# Patient Record
Sex: FEMALE | Race: WHITE | URBAN - METROPOLITAN AREA
[De-identification: names, ages, dates, MRNs, and addresses within clinical notes are randomized per-mention and may not be internally consistent; named-entity substitution may affect disease eponyms.]

---

## 2017-02-24 ENCOUNTER — TRANSFERRED RECORDS (OUTPATIENT)
Dept: HEALTH INFORMATION MANAGEMENT | Facility: CLINIC | Age: 57
End: 2017-02-24

## 2017-03-02 ENCOUNTER — DOCUMENTATION ONLY (OUTPATIENT)
Dept: LAB | Facility: CLINIC | Age: 57
End: 2017-03-02

## 2017-03-02 NOTE — PROGRESS NOTES
This lab would need to come from the provider that is requesting this lab, the hematologist.    Called patient on both home and cell, left message with phone number to call back.      Need to know who the hematologist is that would like this lab.      3/3/17:    See lab appointment was canceled after the message was left at patient's home yesterday.    Lyndsay Shah RN, [facility]

## 2017-03-02 NOTE — PROGRESS NOTES
Pt is coming in on 3-3 for blood work. Pt is requesting a fasting glucose test to be done  Thanks Mirela KIM

## 2017-09-22 ENCOUNTER — TRANSFERRED RECORDS (OUTPATIENT)
Dept: HEALTH INFORMATION MANAGEMENT | Facility: CLINIC | Age: 57
End: 2017-09-22

## 2017-10-29 ENCOUNTER — HEALTH MAINTENANCE LETTER (OUTPATIENT)
Age: 57
End: 2017-10-29

## 2017-12-12 ENCOUNTER — TELEPHONE (OUTPATIENT)
Dept: PEDIATRICS | Facility: CLINIC | Age: 57
End: 2017-12-12

## 2017-12-12 NOTE — TELEPHONE ENCOUNTER
"I received a telephone call from Chasidy \"Nikki\" regarding her grandchildren's recent genetics appointment for hemochromatosis.  At that appointment a plan was made to obtain records from Nikki and begin genetic testing on her, as she appears to be the most severely affected family member.  Nikki expressed understanding and will fax a signed release of information.  Once those records are obtained we will plan a genetic counseling appointment for genetic testing.  This will likely include sequencing of a panel of genes for hemochromatosis, as the pattern of affected individuals in the family is somewhat atypical of the common type of hemochromatosis.  Nikki was in agreement with this plan and had no additional questions or concerns at this time.      Kelli Miramontes MS [facility]  Genetic Counselor  Division of Genetics and Metabolism      "

## 2018-01-05 ENCOUNTER — TELEPHONE (OUTPATIENT)
Dept: PEDIATRICS | Facility: CLINIC | Age: 58
End: 2018-01-05

## 2018-01-05 NOTE — TELEPHONE ENCOUNTER
I contacted Nikki to discuss our plan to pursue additional genetic testing for hemochromatosis.  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  Dr. Beltran recommends sequencing of the HFE gene as well as other genes associated with hemochromatosis.  This can be accomplished by a genetic counseling appointment.  Nikki was in agreement with this plan and an appointment was scheduled at her convenience.  I remain available for additional questions or concerns in the meantime.      Kelli Miramontes MS [facility]  Genetic Counselor  Division of Genetics and Metabolism

## 2018-01-12 ENCOUNTER — OFFICE VISIT (OUTPATIENT)
Dept: PEDIATRICS | Facility: CLINIC | Age: 58
End: 2018-01-12
Attending: GENETIC COUNSELOR, MS
Payer: COMMERCIAL

## 2018-01-12 DIAGNOSIS — E83.110 HEREDITARY HEMOCHROMATOSIS (H): Primary | ICD-10-CM

## 2018-01-12 DIAGNOSIS — Z71.83 ENCOUNTER FOR NONPROCREATIVE GENETIC COUNSELING: ICD-10-CM

## 2018-01-12 DIAGNOSIS — R79.89 ELEVATED FERRITIN: ICD-10-CM

## 2018-01-12 DIAGNOSIS — Z82.79 FAMILY HISTORY OF CONGENITAL OR GENETIC CONDITION: ICD-10-CM

## 2018-01-12 PROCEDURE — 96040 ZZH GENETIC COUNSELING, EACH 30 MINUTES: CPT | Mod: ZF | Performed by: GENETIC COUNSELOR, MS

## 2018-01-12 PROCEDURE — 40000795 ZZHCL STATISTIC DNA PROCESS AND HOLD: Performed by: MEDICAL GENETICS

## 2018-01-12 PROCEDURE — 36415 COLL VENOUS BLD VENIPUNCTURE: CPT | Performed by: MEDICAL GENETICS

## 2018-01-12 NOTE — PROGRESS NOTES
"Presenting Information:  Chasidy \"Nikki\" Aundrea is a 57-year-old woman with a clinical diagnosis of hemochromatosis.  She has been found to have one copy of the common H63D mutation in the HFE gene by a 3-mutation panel.  I met with Nikki at the request of Dr. Parish Beltran in follow-up to Nikki's grandchildren's appointment for elevated ferritin.  During our visit the family history was updated, we reviewed the genetics and inheritance of hemochromatosis, and we discussed genetic testing which was pursued.      Family History:  A detailed pedigree had previously been obtained for Nikki's grandchildren.  This was updated today to include additional details about Nikki's family.  A copy was scanned into Nikki's electronic medical record.  It is significant for the following:    Nikki herself has elevated ferritin levels in the high triple digits.  She has had approximately four therepeutic phlebotomy draws in 2017 which initially looked to be reducing her ferritin but these have since rebounded to approximately 800 ng/mL.  She is waiting to proceed with more draws until her hemoglobin is higher which historically has been low.  Nikki had a hysterectomy at age 41 due to fibroids and a thyroidectomy due to precancerous cells.      Nikki has one daughter and one son, both of whom have been found to have one copy of the H63D mutation and elevated ferritin.      Nikki's daughter's children were previously seen by myself and Dr. Parish Beltran due to elevated ferritin around 700 nd/mL for both.      Nikki's son has four children, three of whom have experienced seizures.  It is not known if these children have had ferritin levels checked.    Nikki has two sisters; one of whom has ferritin in the 3000 range that has improved with phlebotomy.  She has not had genetic testing as far as Nikki is aware.      Nikki's mother is 76-years-old and has psoriatic arthritis.  Her ferritin has been measured to be normal.    Nikki's father is " 78-years-old.  He has type II diabetes.  He has been found to have ferritin in the 900s and carries one copy of the H63D mutation.      Nikki's father had nine brothers and sisters.  Multiple family members have had cancer.  We discussed that while cancer is unfortunately a common condition, having more cancer than expected in a family does raise the possibility of shared genetic or environmental causes increasing the risk.  Chasidy indicated she has discussed this history with her primary care provider who recommended early breast cancer screening which was certainly indicated.  Importantly, one of Nikki's uncles  of liver cirrhosis at age 49, although he did have a history of alcohol use.    Nikki is of , Monegasque, [place], and [place]ian ancestry on her maternal side and Polish and Roman Catholic ancestry on her paternal side.  Consanguinity was denied.      Discussion:  During our visit we reviewed that genes are long stretches of DNA that are responsible for how our bodies look and how our bodies work.  We all have two copies of each gene; one inherited from the mother and one inherited from the father.  When there is a change, called a mutation, in the DNA sequence of a gene it can cause the signs and symptoms of a genetic condition.       Hemochromatosis is due to mutations in one of several genes important for iron regulation in the body.  The most common type of hemochromatosis is due to a gene called HFE.  There are two common mutations in this gene that are responsible for the large majority of hemochromatosis cases: H63D and C282Y.  As noted in the family history, Nikki and numerous other family members have been found to have a single copy of the H63D mutation.       Hemochromatosis due to the HFE gene is inherited in an autosomal recessive pattern, meaning a patient would need to have a mutation in both copies of the HFE gene to have symptoms of hemochromatosis.  Therefore we are suspicious  that the affected family members may have a second unidentified mutation in the HFE gene.  This can occur because many genetic tests for hemochromatosis only look for the most common HFE mutations and do not identify more rare mutations.  Nikki's single copy of the H63D mutation does not offer sufficient explanation for her hemochromatosis.       In addition to the possibility of additional HFE mutations in the family, there are other genes that can cause hemochromatosis.  The [id number] gene, in particular, is associated with autosomal dominant hemochromatosis which fits the pattern of affected individuals in this family.  In an effort to determine the cause of hemochromatosis in the family which would clarify the risks and potential need for further management, more comprehensive genetic testing was recommended by Dr. Beltran.  This includes sequencing and deletion/duplication analysis of the HFE gene to identify more rare mutations, as well as sequencing and deletion/duplication analysis of the following additional genes: HAMP, HFE2, UNX25S7, TFR2, and FTH1.    Identifying the underlying cause for Chasidy's clinical diagnosis of hemochromatosis is important for several reasons.  This may help predict possible symptoms and additional complications besides the high ferritin, and may guide management including the need for therapeutic phlebotomy.  This will additionally help us better understand and predict the risks to other family members, and to facilitate testing for family members.    We reviewed the costs, limitations, and benefits of testing and Nikki provided written informed consent for this. We also discussed insurance coverage for testing and on Nikki's behalf a prior authorization for this test will be submitted to Nikki's insurance plan.  The testing will remain on hold until approval is obtained.  Once started, results of the testing take approximately 8-12 weeks and I will contact Nikki by telephone when  they return.       It was a pleasure meeting with Nikki today.  She was encouraged to contact me with additional questions or concerns.    Plan:  1.  A prior authorization will be submitted for genetic testing  2.  Once approved, genetic testing for hemochromatosis through analysis of the following genes: HFE, HAMP, HFE2, ELV11N2, TFR2, and FTH1      Kelli Miramontes [facility]  Genetic Counselor  Division of Genetics and Metabolism    Total time spent in consultation with the patient was approximately 35 minutes    Cc: No letter

## 2018-01-12 NOTE — LETTER
"  1/12/2018      RE: Chasidy Guillaume  [address]  [place] [place] [place] [address]       Presenting Information:  Chasidy \"Nikki\" Aundrea is a 57-year-old woman with a clinical diagnosis of hemochromatosis.  She has been found to have one copy of the common H63D mutation in the HFE gene by a 3-mutation panel.  I met with Nikki at the request of Dr. Parish Beltran in follow-up to Nikki's grandchildren's appointment for elevated ferritin.  During our visit the family history was updated, we reviewed the genetics and inheritance of hemochromatosis, and we discussed genetic testing which was pursued.      Family History:  A detailed pedigree had previously been obtained for Nikki's grandchildren.  This was updated today to include additional details about Nikki's family.  A copy was scanned into Nikki's electronic medical record.  It is significant for the following:    Nikki herself has elevated ferritin levels in the high triple digits.  She has had approximately four therepeutic phlebotomy draws in 2017 which initially looked to be reducing her ferritin but these have since rebounded to approximately 800 ng/mL.  She is waiting to proceed with more draws until her hemoglobin is higher which historically has been low.  Nikki had a hysterectomy at age 41 due to fibroids and a thyroidectomy due to precancerous cells.      Nikki has one daughter and one son, both of whom have been found to have one copy of the H63D mutation and elevated ferritin.      Nikki's daughter's children were previously seen by myself and Dr. Parish Beltran due to elevated ferritin around 700 nd/mL for both.      Nikki's son has four children, three of whom have experienced seizures.  It is not known if these children have had ferritin levels checked.    Nikki has two sisters; one of whom has ferritin in the 3000 range that has improved with phlebotomy.  She has not had genetic testing as far as Nikki is aware.      Nikki's mother is 76-years-old and has " psoriatic arthritis.  Her ferritin has been measured to be normal.    Nikki's father is 78-years-old.  He has type II diabetes.  He has been found to have ferritin in the 900s and carries one copy of the H63D mutation.      Nikki's father had nine brothers and sisters.  Multiple family members have had cancer.  We discussed that while cancer is unfortunately a common condition, having more cancer than expected in a family does raise the possibility of shared genetic or environmental causes increasing the risk.  Chasidy indicated she has discussed this history with her primary care provider who recommended early breast cancer screening which was certainly indicated.  Importantly, one of Nikki's uncles  of liver cirrhosis at age 49, although he did have a history of alcohol use.    Nikki is of , [place] Islander, Syriac, and Cam[place] ancestry on her maternal side and Polish and Mormonism ancestry on her paternal side.  Consanguinity was denied.      Discussion:  During our visit we reviewed that genes are long stretches of DNA that are responsible for how our bodies look and how our bodies work.  We all have two copies of each gene; one inherited from the mother and one inherited from the father.  When there is a change, called a mutation, in the DNA sequence of a gene it can cause the signs and symptoms of a genetic condition.       Hemochromatosis is due to mutations in one of several genes important for iron regulation in the body.  The most common type of hemochromatosis is due to a gene called HFE.  There are two common mutations in this gene that are responsible for the large majority of hemochromatosis cases: H63D and C282Y.  As noted in the family history, Nikki and numerous other family members have been found to have a single copy of the H63D mutation.       Hemochromatosis due to the HFE gene is inherited in an autosomal recessive pattern, meaning a patient would need to have a mutation in both  copies of the HFE gene to have symptoms of hemochromatosis.  Therefore we are suspicious that the affected family members may have a second unidentified mutation in the HFE gene.  This can occur because many genetic tests for hemochromatosis only look for the most common HFE mutations and do not identify more rare mutations.   Nikki's single copy of the H63D mutation does not offer sufficient explanation for her hemochromatosis.       In addition to the possibility of additional HFE mutations in the family, there are other genes that can cause hemochromatosis.  The [id number] gene, in particular, is associated with autosomal dominant hemochromatosis which fits the pattern of affected individuals in this family.  In an effort to determine the cause of hemochromatosis in the family which would clarify the risks and potential need for further management, more comprehensive genetic testing was recommended by Dr. Beltran.   This includes sequencing and deletion/duplication analysis of the HFE gene to identify more rare mutations, as well as sequencing and deletion/duplication analysis of the following additional genes: HAMP, HFE2, [id number], TFR2, and FTH1.    Identifying the underlying cause for Chasidy's clinical diagnosis of hemochromatosis is important for several reasons.  This may help predict possible symptoms and additional complications besides the high ferritin, and may guide management including the need for therapeutic phlebotomy.  This will additionally help us better understand and predict the risks to other family members, and to facilitate testing for family members.    We reviewed the costs, limitations, and benefits of testing and Nikki provided written informed consent for this. We also discussed insurance coverage for testing and on Nikki's behalf a prior authorization for this test will be submitted to Nikki's insurance plan.  The testing will remain on hold until approval is obtained.  Once started,  results of the testing take approximately 8-12 weeks and I will contact Nikki by telephone when they return.       It was a pleasure meeting with Nikki today.  She was encouraged to contact me with additional questions or concerns.    Plan:  1.  A prior authorization will be submitted for genetic testing  2.  Once approved, genetic testing for hemochromatosis through analysis of the following genes: HFE, HAMP, HFE2, [id number], TFR2, and FTH1      Kelli Miramontes [facility]  Genetic Counselor  Division of Genetics and Metabolism    Total time spent in consultation with the patient was approximately 35 minutes    Cc: No letter      Kelli Miramontes

## 2018-01-12 NOTE — LETTER
Date:January 15, 2018      Provider requested that no letter be sent. Do not send.       [facility] Health Information

## 2018-01-16 LAB — COPATH REPORT: NORMAL

## 2018-01-17 ENCOUNTER — TELEPHONE (OUTPATIENT)
Dept: CONSULT | Facility: CLINIC | Age: 58
End: 2018-01-17

## 2018-01-17 NOTE — TELEPHONE ENCOUNTER
Nikki called me back and stated that she wants to hold testing. She would not like to proceed at this time.       Teressa Forrester    Division of Genetics  [facility]  P: [phone]

## 2018-01-17 NOTE — TELEPHONE ENCOUNTER
Notified Nikki that we received prior authorization approval valid from 1/12/18 to 6/11/18. Authorization number is [id number]. Provided Nikki with estimated out of pocket cost for testing. Nikki expressed understanding and stated that she wants to proceed with testing. We will call when results are available. Nikki had no further questions.    Teressa Forrester    Division of Genetics  [facility]  P: [phone]

## 2018-06-07 ENCOUNTER — TELEPHONE (OUTPATIENT)
Dept: PEDIATRICS | Facility: CLINIC | Age: 58
End: 2018-06-07

## 2018-06-07 NOTE — TELEPHONE ENCOUNTER
I contacted Nikki to discuss genetic testing for hemochromatosis.  Dr. Parish Beltran and myself had originally seen Nikki's grandchildren for this.  At that visit it was recommended that their grandmother, Nikki, was the best family member to begin testing.  I then saw Nikki for a genetic counseling appointment and testing was drawn and a prior authorization was submitted.  The prior authorization was approved but due to Nikki's large unmet deductible and co-insurance she did not wish to pursue testing at that time.  The prior authorization expires next week so I contacted Nikki again to ensure she still did not wish to proceed.  Nikki indicated she is still not interested in proceeding with genetic testing.  I inquired how she and her grandchildren are doing and she indicated everyone is doing well.  We remain available should Nikki or other family members wish to proceed with testing in the future.        Kelli Miramontes MS [facility]  Genetic Counselor  Division of Genetics and Metabolism

## 2020-03-01 ENCOUNTER — HEALTH MAINTENANCE LETTER (OUTPATIENT)
Age: 60
End: 2020-03-01

## 2020-12-14 ENCOUNTER — HEALTH MAINTENANCE LETTER (OUTPATIENT)
Age: 60
End: 2020-12-14

## 2021-04-17 ENCOUNTER — HEALTH MAINTENANCE LETTER (OUTPATIENT)
Age: 61
End: 2021-04-17

## 2021-10-02 ENCOUNTER — HEALTH MAINTENANCE LETTER (OUTPATIENT)
Age: 61
End: 2021-10-02

## 2022-03-13 ENCOUNTER — HEALTH MAINTENANCE LETTER (OUTPATIENT)
Age: 62
End: 2022-03-13

## 2022-05-08 ENCOUNTER — HEALTH MAINTENANCE LETTER (OUTPATIENT)
Age: 62
End: 2022-05-08

## 2023-01-14 ENCOUNTER — HEALTH MAINTENANCE LETTER (OUTPATIENT)
Age: 63
End: 2023-01-14

## 2023-07-22 ENCOUNTER — HEALTH MAINTENANCE LETTER (OUTPATIENT)
Age: 63
End: 2023-07-22